# Patient Record
Sex: MALE | Race: WHITE | Employment: OTHER | URBAN - METROPOLITAN AREA
[De-identification: names, ages, dates, MRNs, and addresses within clinical notes are randomized per-mention and may not be internally consistent; named-entity substitution may affect disease eponyms.]

---

## 2017-04-03 RX ORDER — CARVEDILOL 6.25 MG/1
TABLET ORAL
Qty: 180 TABLET | Refills: 2 | Status: SHIPPED | OUTPATIENT
Start: 2017-04-03 | End: 2018-01-07 | Stop reason: SDUPTHER

## 2017-07-31 RX ORDER — WARFARIN SODIUM 5 MG/1
TABLET ORAL
Qty: 90 TABLET | Refills: 1 | Status: SHIPPED | OUTPATIENT
Start: 2017-07-31 | End: 2018-01-29 | Stop reason: SDUPTHER

## 2017-12-22 ENCOUNTER — OFFICE VISIT (OUTPATIENT)
Dept: CARDIOLOGY CLINIC | Age: 77
End: 2017-12-22

## 2017-12-22 VITALS
WEIGHT: 202 LBS | HEART RATE: 74 BPM | SYSTOLIC BLOOD PRESSURE: 100 MMHG | OXYGEN SATURATION: 97 % | BODY MASS INDEX: 26.77 KG/M2 | DIASTOLIC BLOOD PRESSURE: 70 MMHG | HEIGHT: 73 IN

## 2017-12-22 DIAGNOSIS — I48.19 PERSISTENT ATRIAL FIBRILLATION (HCC): Primary | Chronic | ICD-10-CM

## 2017-12-22 DIAGNOSIS — R00.2 PALPITATIONS: Chronic | ICD-10-CM

## 2017-12-22 DIAGNOSIS — I42.8 NONISCHEMIC CARDIOMYOPATHY (HCC): ICD-10-CM

## 2017-12-22 PROCEDURE — 99214 OFFICE O/P EST MOD 30 MIN: CPT | Performed by: INTERNAL MEDICINE

## 2017-12-22 NOTE — PATIENT INSTRUCTIONS
is it treated? Treatments can help you feel better and prevent future problems, especially stroke and heart failure. The main types of treatment slow the heart rate, control the heart rhythm, and help prevent stroke. Your treatment will depend on the cause of your atrial fibrillation, your symptoms, and your risk for stroke. · Heart rate treatment. Medicine may be used to slow your heart rate. Your heartbeat may still be irregular. But these medicines keep your heart from beating too fast. They may also help relieve your symptoms. · Heart rhythm treatment. Different treatments may be used to try to stop atrial fibrillation and keep it from returning. They can also relieve symptoms. These treatments include medicine, electrical cardioversion to shock the heart back to a normal rhythm, a procedure called catheter ablation, and heart surgery. · Stroke prevention. You and your doctor can decide how to lower your risk. You may decide to take a blood-thinning medicine, such as aspirin or an anticoagulant. How can you live well with it? You can live well and help manage atrial fibrillation by having a heart-healthy lifestyle. To have a heart-healthy lifestyle:  · Don't smoke. · Eat heart-healthy foods. · Be active. Talk to your doctor about what type and level of exercise is safe for you. · Stay at a healthy weight. Lose weight if you need to. · Manage stress. · Avoid alcohol if it triggers symptoms. · Manage other health problems such as high blood pressure, high cholesterol, and diabetes. · Avoid getting sick from the flu. Get a flu shot every year. Where can you learn more? Go to [email or web address] and sign in to your VoiceGem account. Enter Z293 in the Enliken box to learn more about \"Learning About Atrial Fibrillation. \"     If you do not have an account, please click on the \"Sign Up Now\" link.   Current as of: September 21, 2016  Content Version: 11.4  © 6062-2013

## 2017-12-22 NOTE — LETTER
3) Palpitations. Uncertain if related to atrial fibrillation. Pt is often a difficult historian. Discussed option of an event monitor to correlate symptoms. 4) Essential hypertension. Controlled. Goal BP <140/90 with DM. 5) Atypical chest pains. Likely noncardiac based on description of sharp, brief, and nonexertional. No plans for stress testing at this time. 6) KESHAWN. Encouraged compliance with CPAP. Thank you very much for allowing me to participate in the care of your patient. Please do not hesitate to contact me if you have any questions. Sincerely,      Abimbola Galeano.  Chel Galvez MD

## 2018-01-08 RX ORDER — CARVEDILOL 6.25 MG/1
TABLET ORAL
Qty: 180 TABLET | Refills: 3 | Status: SHIPPED | OUTPATIENT
Start: 2018-01-08 | End: 2018-07-20 | Stop reason: DRUGHIGH

## 2018-01-09 NOTE — COMMUNICATION BODY
HPI:  The patient is 76 y.o. male with a past medical history significant for a cardiomyopathy, Type II DM, and chronic atrial fibrillation presents today for routine follow-up. Previously, he said the episodes of fast heart rates and nonexertional \"sharp\" chest pain have greatly improved. He reports repeated falls due to his neuropathy but denies significant injury. He denies any falls since last office visit. He described a burning sensation and heaviness in his feet and legs only when walking and his ABIs are normal. Today, he reports an episode of rapid palpitations about 2 months which improved without any particular intervention and has not recurred. He denies exertional chest pain/pressure, dyspnea at rest, PND, orthopnea, lightheadedness, weight changes, LE edema, and syncope. He reports compliance to medications. Assessment/Plan:   1)  Persistent atrial fibrillation s/p failed CV x1. (Unable to tolerate amiodarone.) Treatment options for atrial fibrillation discussed including risks and benefits of long term anticoagulation options. Pt preferred coumadin in past due to cost. Agrees to continue warfarin for now. Consider switching to NOAC in future if pt prefers. Previously stopped digoxin and diltiazem. Continue B-blocker. 2) Nonischemic cardiomyopathy. Appears euvolemic. LVEF 40% improved to 50% on echo. Normal perfusion study but ECG portion abnormal. However, the patient had only been off digoxin 2 days which likely contributed to the abnormal repolarization on the ECG. Continue B-blocker and ACE-I.     3) Palpitations. Uncertain if related to atrial fibrillation. Pt is often a difficult historian. Discussed option of an event monitor to correlate symptoms. 4) Essential hypertension. Controlled. Goal BP <140/90 with DM. 5) Atypical chest pains. Likely noncardiac based on description of sharp, brief, and nonexertional. No plans for stress testing at this time. 6) KESHAWN.  Encouraged compliance with CPAP. Thank you very much for allowing me to participate in the care of your patient. Please do not hesitate to contact me if you have any questions. Sincerely,  Abimbola Galeano.  Chel Galvez MD

## 2018-01-30 RX ORDER — WARFARIN SODIUM 5 MG/1
TABLET ORAL
Qty: 90 TABLET | Refills: 1 | Status: SHIPPED | OUTPATIENT
Start: 2018-01-30 | End: 2018-09-05 | Stop reason: SDUPTHER

## 2018-07-20 ENCOUNTER — OFFICE VISIT (OUTPATIENT)
Dept: CARDIOLOGY CLINIC | Age: 78
End: 2018-07-20

## 2018-07-20 VITALS
WEIGHT: 198 LBS | SYSTOLIC BLOOD PRESSURE: 114 MMHG | OXYGEN SATURATION: 97 % | HEIGHT: 73 IN | BODY MASS INDEX: 26.24 KG/M2 | DIASTOLIC BLOOD PRESSURE: 70 MMHG | HEART RATE: 88 BPM

## 2018-07-20 DIAGNOSIS — G47.33 OSA (OBSTRUCTIVE SLEEP APNEA): ICD-10-CM

## 2018-07-20 DIAGNOSIS — I10 ESSENTIAL HYPERTENSION: ICD-10-CM

## 2018-07-20 DIAGNOSIS — I48.19 PERSISTENT ATRIAL FIBRILLATION (HCC): Primary | Chronic | ICD-10-CM

## 2018-07-20 DIAGNOSIS — I42.8 NICM (NONISCHEMIC CARDIOMYOPATHY) (HCC): ICD-10-CM

## 2018-07-20 PROCEDURE — 99214 OFFICE O/P EST MOD 30 MIN: CPT | Performed by: INTERNAL MEDICINE

## 2018-07-20 RX ORDER — CARVEDILOL 3.12 MG/1
3.12 TABLET ORAL 2 TIMES DAILY WITH MEALS
COMMUNITY

## 2018-07-20 NOTE — PROGRESS NOTES
[facility]      Cardiology Consult    Angel Dove  1940    July 20, 2018    Reason for Visit: f/u for atrial fibrillation and cardiomyopathy. CC: \"I'm fine\"     HPI:  The patient is 76 y.o. male with a past medical history significant for a cardiomyopathy, Type II DM, and chronic atrial fibrillation presents today for routine follow-up. Previously, he said the episodes of fast heart rates and nonexertional \"sharp\" chest pain have greatly improved. He reports repeated falls due to his neuropathy but denies significant injury. He denies any falls since last office visit. He described a burning sensation and heaviness in his feet and legs only when walking and his ABIs are normal. Today, he is accompanied by his wife and denies any new cardiac complaints. He reports his PCP decreased the Coreg to 3.125 mg BID with complaints of myalgias. He states his symptoms have improved. He is tolerating coumadin and follows with the anticoagulation clinic. He reports his INR remains therapeutic. He denies exertional chest pain/pressure, dyspnea at rest, PND, orthopnea, lightheadedness, weight changes, LE edema, and syncope. He reports compliance to medications.      Past Medical History:   Diagnosis Date    Atrial fibrillation ([address]     Diabetes mellitus ([address]     Murmur     Rheumatic fever     as a child    Type II or unspecified type diabetes mellitus without mention of complication, not stated as uncontrolled      Past Surgical History:   Procedure Laterality Date    BACK SURGERY      CARDIOVERSION  6/5/2012    CATARACT REMOVAL      EYE SURGERY       Family History   Problem Relation Age of Onset    Diabetes Mother     Cancer Mother     Heart Disease Father      Social History   Substance Use Topics    Smoking status: Former Smoker     Packs/day: 1.00     Years: 10.00     Quit date: 10/23/1972    Smokeless tobacco: Current User     Types: Chew    Alcohol use No      Comment: No

## 2018-07-20 NOTE — LETTER
[address] Cardiology - [address] [facility] [address]  [address]   [address] [address]  Phone: [phone]  Fax: [phone]    Emanuel Resendiz MD        July 25, 2018     Leonard Hardy MD, MD  [place] [id number]    Patient: Elenita Decker  MR Number: [id number]  YOB: 1940  Date of Visit: 7/20/2018    Dear Dr. Leonard Hardy MD:    HPI:  The patient is 76 y.o. male with a past medical history significant for a cardiomyopathy, Type II DM, and chronic atrial fibrillation presents today for routine follow-up. Previously, he said the episodes of fast heart rates and nonexertional \"sharp\" chest pain have greatly improved. He reports repeated falls due to his neuropathy but denies significant injury. He denies any falls since last office visit. He described a burning sensation and heaviness in his feet and legs only when walking and his ABIs are normal. Today, he is accompanied by his wife and denies any new cardiac complaints. He reports his PCP decreased the Coreg to 3.125 mg BID with complaints of myalgias. He states his symptoms have improved. He is tolerating coumadin and follows with the anticoagulation clinic. He reports his INR remains therapeutic. He denies exertional chest pain/pressure, dyspnea at rest, PND, orthopnea, lightheadedness, weight changes, LE edema, and syncope. He reports compliance to medications. Assessment/Plan:   1)  Persistent atrial fibrillation s/p failed CV x1. (Unable to tolerate amiodarone.) Asymptomatic. Treatment options for atrial fibrillation discussed including risks and benefits of long term anticoagulation options. Pt preferred coumadin in past due to cost. Continue warfarin and managed by anticoagulation clinic. Previously stopped digoxin and diltiazem. Continue B-blocker. Continue to monitor heart rate with the decreased dose of Coreg and follow up with PCP for routine monitoring.

## 2018-09-05 RX ORDER — WARFARIN SODIUM 5 MG/1
TABLET ORAL
Qty: 90 TABLET | Refills: 1 | Status: SHIPPED | OUTPATIENT
Start: 2018-09-05 | End: 2019-03-22 | Stop reason: SDUPTHER

## 2019-03-22 RX ORDER — WARFARIN SODIUM 5 MG/1
TABLET ORAL
Qty: 90 TABLET | Refills: 1 | Status: SHIPPED | OUTPATIENT
Start: 2019-03-22 | End: 2019-10-19 | Stop reason: SDUPTHER

## 2019-07-16 NOTE — PROGRESS NOTES
in muscle strength, numbness or tingling. · Psychiatric: No anxiety or depression. · Endocrine: No temperature intolerance. No excessive thirst, fluid intake, or urination. No tremor. · Hematologic/Lymphatic: No abnormal bruising or bleeding, blood clots or swollen lymph nodes. Allergic/Immunologic: No nasal congestion or hives. Physical Exam:   /60 (Site: Left Upper Arm, Position: Sitting, Cuff Size: Medium Adult)   Pulse 95   Ht 6' 1\" (1.854 m)   Wt 199 lb 9.6 oz (90.5 kg)   SpO2 97% Comment: room air  BMI 26.33 kg/m²      Constitutional: The patient is oriented to person, place, and time. Appears well-developed and well-nourished. In no acute distress. Head: Normocephalic and atraumatic. Pupils equal and round. Neck: Neck supple. No JVP or carotid bruit appreciated. No mass and no thyromegaly present. No lymphadenopathy present. Cardiovascular: Normal rate. Normal heart sounds. Exam reveals no gallop and no friction rub. No murmur heard. Pulmonary/Chest: Effort normal and breath sounds normal. No respiratory distress. No wheezes, rhonchi or rales. Abdominal: Soft, non-tender. Bowel sounds are normal. Exhibits no organomegaly, mass or bruit. Extremities: No edema. No cyanosis or clubbing. Pulses are 2+ radial and carotid bilaterally. Neurological: No gross cranial nerve deficit. Coordination normal.   Skin: Skin is warm and dry. There is no rash or diaphoresis. Psychiatric: Patient has a normal mood and affect. Speech is normal and behavior is normal.     Lab Review:   FLP:  4/2014 , Trig 174,, HDL 31  No components found for: CHLPL,  TRIG,  HDL,  LDLCALC,  LDLDIRECT,  LABVLDL  BUN/Creatinine:  BUN 22 creat 1.11 (3/2013)      BUN/Creatinine: BUN 22, creat 1.5 (6/2018)    EKG Interpretation: 10/23/12 afib, poor r wave progression. Nonspecific st-t wave abnormality possible dig effect.   11/25/13 Atrial fibrillation w RVR -Poor R-wave progression -Nonspecific ST-T abnormality    1/10/14 Atrial fibrillation -Poor R wave progression. -Nonspecific T-abnormality. Image Review:   Echo 3/3/12 LVEF 40%, bi-atrial enlargement, ?dilated ascending aorta. Echo 1/9/13 EF 40-45%, mild biatrial enlargement, RV systolic function mildly reduced, trivial MR/TR. Echo 9/2014 EF 50%. Trace MR.     CT chest 10/26/12 shows no aneurysm    GXT nuclear: 10/2012 EF 62% (afib). Normal perfusion. GXT portion abnormal with significant ST depression but pt was recently discontinued from digoxin. Exercised 9 minutes with no chest pain. Assessment/Plan:   1)  Persistent atrial fibrillation s/p failed CV x1. (Unable to tolerate amiodarone.) Asymptomatic. Treatment options for atrial fibrillation discussed including risks and benefits of long term anticoagulation options. Pt preferred coumadin in past due to cost. Continue warfarin and managed by anticoagulation clinic. Previously stopped digoxin and diltiazem. Continue B-blocker. 2) Nonischemic cardiomyopathy. Appears euvolemic. LVEF 40% improved to 50% on echo. Normal perfusion study but ECG portion abnormal. However, the patient had only been off digoxin 2 days which likely contributed to the abnormal repolarization on the ECG. Reports worsening EMANUEL, will repeat echocardiogram but he does not appear volume overloaded. Continue B-blocker and ACE-I.     3) Essential hypertension. Controlled. Goal BP <130/80 with DM. 4) KESHAWN. Encouraged compliance with CPAP. Follow up in 1 year assuming echo is unchanged. Thank you very much for allowing me to participate in the care of your patient. Please do not hesitate to contact me if you have any questions. Sincerely,  Jeff Coto. Juan Antonio Dwyer, [address] [place] [address], [address] [place] [address]  Ph: [phone]  Fax: [phone]    This note was scribed in the presence of Dr Juan Antonio Dwyer MD by Ninfa Sharma RN.    Physician Attestation: The scribes documentation has been prepared under my direction and personally reviewed by me in its entirety. I confirm that the note above accurately reflects all work, treatment, procedures, and medical decision making performed by me. All portions of the note including but not limited to the chief complaint, history of present illness, physical exam, assessment and plan/medical decision making were personally reviewed, edited, and updated on the day of the visit.

## 2019-07-19 ENCOUNTER — OFFICE VISIT (OUTPATIENT)
Dept: CARDIOLOGY CLINIC | Age: 79
End: 2019-07-19
Payer: COMMERCIAL

## 2019-07-19 VITALS
WEIGHT: 199.6 LBS | DIASTOLIC BLOOD PRESSURE: 60 MMHG | OXYGEN SATURATION: 97 % | SYSTOLIC BLOOD PRESSURE: 100 MMHG | HEART RATE: 95 BPM | BODY MASS INDEX: 26.45 KG/M2 | HEIGHT: 73 IN

## 2019-07-19 DIAGNOSIS — I10 ESSENTIAL HYPERTENSION: ICD-10-CM

## 2019-07-19 DIAGNOSIS — R06.09 DOE (DYSPNEA ON EXERTION): ICD-10-CM

## 2019-07-19 DIAGNOSIS — I48.19 PERSISTENT ATRIAL FIBRILLATION (HCC): Primary | ICD-10-CM

## 2019-07-19 DIAGNOSIS — I42.8 NICM (NONISCHEMIC CARDIOMYOPATHY) (HCC): ICD-10-CM

## 2019-07-19 DIAGNOSIS — G47.33 OSA (OBSTRUCTIVE SLEEP APNEA): ICD-10-CM

## 2019-07-19 PROCEDURE — 99214 OFFICE O/P EST MOD 30 MIN: CPT | Performed by: INTERNAL MEDICINE

## 2019-07-19 NOTE — PATIENT INSTRUCTIONS
sodium\" may still have too much sodium. Be sure to read the label to see how much sodium you are getting. · Buy fresh vegetables, or frozen vegetables without added sauces. Buy low-sodium versions of canned vegetables, soups, and other canned goods. Prepare low-sodium meals  · Cut back on the amount of salt you use in cooking. This will help you adjust to the taste. Do not add salt after cooking. One teaspoon of salt has about 2,300 mg of sodium. · Take the salt shaker off the table. · Flavor your food with garlic, lemon juice, onion, vinegar, herbs, and spices. Do not use soy sauce, lite soy sauce, steak sauce, onion salt, garlic salt, celery salt, mustard, or ketchup on your food. · Use low-sodium salad dressings, sauces, and ketchup. Or make your own salad dressings and sauces without adding salt. · Use less salt (or none) when recipes call for it. You can often use half the salt a recipe calls for without losing flavor. Other foods such as rice, pasta, and grains do not need added salt. · Rinse canned vegetables, and cook them in fresh water. This removes some--but not all--of the salt. · Avoid water that is naturally high in sodium or that has been treated with water softeners, which add sodium. Call your local water company to find out the sodium content of your water supply. If you buy bottled water, read the label and choose a sodium-free brand. Avoid high-sodium foods  · Avoid eating:  ? Smoked, cured, salted, and canned meat, fish, and poultry. ? Ham, magallanes, hot dogs, and luncheon meats. ? Regular, hard, and processed cheese and regular peanut butter. ? Crackers with salted tops, and other salted snack foods such as pretzels, chips, and salted popcorn. ? Frozen prepared meals, unless labeled low-sodium. ? Canned and dried soups, broths, and bouillon, unless labeled sodium-free or low-sodium. ? Canned vegetables, unless labeled sodium-free or low-sodium. ?  [employer] fries, pizza, tacos, and

## 2019-08-05 ENCOUNTER — TELEPHONE (OUTPATIENT)
Dept: CARDIOLOGY CLINIC | Age: 79
End: 2019-08-05

## 2019-08-05 NOTE — TELEPHONE ENCOUNTER
Please inform pt that his echo showed that his heart muscle is strong. Continue current medications per Dr. Elmo Van.

## 2019-08-05 NOTE — TELEPHONE ENCOUNTER
Dr. Eliseo Stringer, echo was ordered to evaluate worsening dyspnea. Echo now shows grade III diastolic dysfx with normal LVEF. See attached office note below. Any new recommendations? Lab Review:   FLP:  4/2014 , Trig 174,, HDL 31  No components found for: CHLPL,  TRIG,  HDL,  LDLCALC,  LDLDIRECT,  LABVLDL  BUN/Creatinine:  BUN 22 creat 1.11 (3/2013)      BUN/Creatinine: BUN 22, creat 1.5 (6/2018)     EKG Interpretation: 10/23/12 afib, poor r wave progression. Nonspecific st-t wave abnormality possible dig effect. 11/25/13 Atrial fibrillation w RVR -Poor R-wave progression -Nonspecific ST-T abnormality    1/10/14 Atrial fibrillation -Poor R wave progression. -Nonspecific T-abnormality.      Image Review:   Echo 3/3/12 LVEF 40%, bi-atrial enlargement, ?dilated ascending aorta.      Echo 1/9/13 EF 40-45%, mild biatrial enlargement, RV systolic function mildly reduced, trivial MR/TR.     Echo 9/2014 EF 50%. Trace MR.      CT chest 10/26/12 shows no aneurysm     GXT nuclear: 10/2012 EF 62% (afib). Normal perfusion. GXT portion abnormal with significant ST depression but pt was recently discontinued from digoxin. Exercised 9 minutes with no chest pain.      Assessment/Plan:   1)  Persistent atrial fibrillation s/p failed CV x1. (Unable to tolerate amiodarone.) Asymptomatic. Treatment options for atrial fibrillation discussed including risks and benefits of long term anticoagulation options. Pt preferred coumadin in past due to cost. Continue warfarin and managed by anticoagulation clinic. Previously stopped digoxin and diltiazem. Continue B-blocker.    2) Nonischemic cardiomyopathy. Appears euvolemic. LVEF 40% improved to 50% on echo. Normal perfusion study but ECG portion abnormal. However, the patient had only been off digoxin 2 days which likely contributed to the abnormal repolarization on the ECG. Reports worsening EMANUEL, will repeat echocardiogram but he does not appear volume overloaded.  Continue B-blocker and ACE-I.      3) Essential hypertension. Controlled. Goal BP <130/80 with DM.       4) KESHAWN. Encouraged compliance with CPAP.

## 2019-10-21 RX ORDER — WARFARIN SODIUM 5 MG/1
TABLET ORAL
Qty: 90 TABLET | Refills: 4 | Status: SHIPPED | OUTPATIENT
Start: 2019-10-21

## 2020-04-27 ENCOUNTER — TELEPHONE (OUTPATIENT)
Dept: CARDIOLOGY CLINIC | Age: 80
End: 2020-04-27

## 2020-04-27 NOTE — TELEPHONE ENCOUNTER
Would recommend contacting PCP if weight is stable and swelling in only one foot. Continue to monitor weight and call if weight increase >5 pounds in 1 week. F/u with Dr. Isabel Hurst as scheduled.

## 2023-06-26 NOTE — PROGRESS NOTES
 warfarin (COUMADIN) 5 MG tablet TAKE 1 TABLET DAILY OR AS DIRECTED BY COUMADIN CLINIC 90 tablet 1    carvedilol (COREG) 6.25 MG tablet TAKE 1 TABLET TWICE A DAY WITH MEALS 180 tablet 2    metFORMIN ER (GLUCOPHAGE-XR) 750 MG XR tablet Take 750 mg by mouth daily (with breakfast)      lisinopril (PRINIVIL;ZESTRIL) 5 MG tablet Take 0.5 tablets by mouth daily. 45 tablet 3    CINNAMON PO Take  by mouth.  Loratadine 10 MG CAPS Take  by mouth.  vitamin B-12 (CYANOCOBALAMIN) 1000 MCG tablet Take 1,000 mcg by mouth daily.  omeprazole (PRILOSEC) 20 MG capsule Take 20 mg by mouth every other day. No current facility-administered medications for this visit. Review of Systems:  · Constitutional: no unanticipated weight loss. There's been no change in energy level, sleep pattern, or activity level. No fevers, chills. · Eyes: No visual changes or diplopia. No scleral icterus. · ENT: No Headaches, hearing loss or vertigo. No mouth sores or sore throat. · Cardiovascular: as reviewed in HPI  · Respiratory: No cough or wheezing, no sputum production. No hematemesis. · Gastrointestinal: No abdominal pain, appetite loss, blood in stools. No change in bowel or bladder habits. · Genitourinary: No dysuria, trouble voiding, or hematuria. · Musculoskeletal:  No gait disturbance, no joint complaints. · Integumentary: No rash or pruritis. · Neurological: No headache, diplopia, change in muscle strength, numbness or tingling. · Psychiatric: No anxiety or depression. · Endocrine: No temperature intolerance. No excessive thirst, fluid intake, or urination. No tremor. · Hematologic/Lymphatic: No abnormal bruising or bleeding, blood clots or swollen lymph nodes. Allergic/Immunologic: No nasal congestion or hives. Physical Exam:   /70   Pulse 74   Ht 6' 1\" (1.854 m)   Wt 202 lb (91.6 kg)   SpO2 97%   BMI 26.65 kg/m²   Constitutional: He is oriented to person, place, and time.  He chest pain. Assessment/Plan:   1)  Persistent atrial fibrillation s/p failed CV x1. (Unable to tolerate amiodarone.) Treatment options for atrial fibrillation discussed including risks and benefits of long term anticoagulation options. Pt preferred coumadin in past due to cost. Agrees to continue warfarin for now. Consider switching to NOAC in future if pt prefers. Previously stopped digoxin and diltiazem. Continue B-blocker. 2) Nonischemic cardiomyopathy. Appears euvolemic. LVEF 40% improved to 50% on echo. Normal perfusion study but ECG portion abnormal. However, the patient had only been off digoxin 2 days which likely contributed to the abnormal repolarization on the ECG. Continue B-blocker and ACE-I.     3) Palpitations. Uncertain if related to atrial fibrillation. Pt is often a difficult historian. Discussed option of an event monitor to correlate symptoms. 4) Essential hypertension. Controlled. Goal BP <140/90 with DM. 5) Atypical chest pains. Likely noncardiac based on description of sharp, brief, and nonexertional. No plans for stress testing at this time. 6) KESHAWN. Encouraged compliance with CPAP. Thank you very much for allowing me to participate in the care of your patient. Please do not hesitate to contact me if you have any questions. Sincerely,  Nick Boyle.  Michelle Llamas, [address] [place] [address], [facility] Jamal Espinoza [facility]  Ph: [phone]  Fax: [phone] Yes